# Patient Record
Sex: FEMALE | Race: WHITE | Employment: FULL TIME | URBAN - METROPOLITAN AREA
[De-identification: names, ages, dates, MRNs, and addresses within clinical notes are randomized per-mention and may not be internally consistent; named-entity substitution may affect disease eponyms.]

---

## 2017-01-16 ENCOUNTER — HOSPITAL ENCOUNTER (EMERGENCY)
Age: 52
Discharge: HOME | End: 2017-01-16
Attending: EMERGENCY MEDICINE
Payer: COMMERCIAL

## 2017-01-16 ENCOUNTER — APPOINTMENT (OUTPATIENT)
Dept: GENERAL RADIOLOGY | Age: 52
End: 2017-01-16
Attending: EMERGENCY MEDICINE
Payer: COMMERCIAL

## 2017-01-16 VITALS
TEMPERATURE: 98.3 F | OXYGEN SATURATION: 96 % | RESPIRATION RATE: 14 BRPM | HEART RATE: 91 BPM | DIASTOLIC BLOOD PRESSURE: 76 MMHG | WEIGHT: 158 LBS | HEIGHT: 66 IN | SYSTOLIC BLOOD PRESSURE: 114 MMHG | BODY MASS INDEX: 25.39 KG/M2

## 2017-01-16 DIAGNOSIS — E87.6 HYPOKALEMIA: ICD-10-CM

## 2017-01-16 DIAGNOSIS — I47.1 SVT (SUPRAVENTRICULAR TACHYCARDIA) (HCC): Primary | ICD-10-CM

## 2017-01-16 LAB
ALBUMIN SERPL BCP-MCNC: 3.6 G/DL (ref 3.5–5)
ALBUMIN/GLOB SERPL: 1 {RATIO} (ref 1.1–2.2)
ALP SERPL-CCNC: 80 U/L (ref 45–117)
ALT SERPL-CCNC: 24 U/L (ref 12–78)
ANION GAP BLD CALC-SCNC: 10 MMOL/L (ref 5–15)
AST SERPL W P-5'-P-CCNC: 25 U/L (ref 15–37)
BASOPHILS # BLD AUTO: 0.1 K/UL (ref 0–0.1)
BASOPHILS # BLD: 1 % (ref 0–1)
BILIRUB SERPL-MCNC: 1 MG/DL (ref 0.2–1)
BUN SERPL-MCNC: 11 MG/DL (ref 6–20)
BUN/CREAT SERPL: 13 (ref 12–20)
CALCIUM SERPL-MCNC: 8.9 MG/DL (ref 8.5–10.1)
CHLORIDE SERPL-SCNC: 102 MMOL/L (ref 97–108)
CO2 SERPL-SCNC: 28 MMOL/L (ref 21–32)
CREAT SERPL-MCNC: 0.88 MG/DL (ref 0.55–1.02)
EOSINOPHIL # BLD: 0.2 K/UL (ref 0–0.4)
EOSINOPHIL NFR BLD: 2 % (ref 0–7)
ERYTHROCYTE [DISTWIDTH] IN BLOOD BY AUTOMATED COUNT: 12.7 % (ref 11.5–14.5)
GLOBULIN SER CALC-MCNC: 3.7 G/DL (ref 2–4)
GLUCOSE SERPL-MCNC: 121 MG/DL (ref 65–100)
HCT VFR BLD AUTO: 43.9 % (ref 35–47)
HGB BLD-MCNC: 14.8 G/DL (ref 11.5–16)
LYMPHOCYTES # BLD AUTO: 37 % (ref 12–49)
LYMPHOCYTES # BLD: 3.9 K/UL (ref 0.8–3.5)
MCH RBC QN AUTO: 31 PG (ref 26–34)
MCHC RBC AUTO-ENTMCNC: 33.7 G/DL (ref 30–36.5)
MCV RBC AUTO: 91.8 FL (ref 80–99)
MONOCYTES # BLD: 0.5 K/UL (ref 0–1)
MONOCYTES NFR BLD AUTO: 5 % (ref 5–13)
NEUTS SEG # BLD: 5.8 K/UL (ref 1.8–8)
NEUTS SEG NFR BLD AUTO: 55 % (ref 32–75)
PLATELET # BLD AUTO: 313 K/UL (ref 150–400)
POTASSIUM SERPL-SCNC: 3.2 MMOL/L (ref 3.5–5.1)
PROT SERPL-MCNC: 7.3 G/DL (ref 6.4–8.2)
RBC # BLD AUTO: 4.78 M/UL (ref 3.8–5.2)
SODIUM SERPL-SCNC: 140 MMOL/L (ref 136–145)
TROPONIN I SERPL-MCNC: <0.04 NG/ML
TSH SERPL DL<=0.05 MIU/L-ACNC: 1.83 UIU/ML (ref 0.36–3.74)
WBC # BLD AUTO: 10.5 K/UL (ref 3.6–11)

## 2017-01-16 PROCEDURE — 93005 ELECTROCARDIOGRAM TRACING: CPT

## 2017-01-16 PROCEDURE — 96361 HYDRATE IV INFUSION ADD-ON: CPT

## 2017-01-16 PROCEDURE — 36415 COLL VENOUS BLD VENIPUNCTURE: CPT | Performed by: EMERGENCY MEDICINE

## 2017-01-16 PROCEDURE — 80053 COMPREHEN METABOLIC PANEL: CPT | Performed by: EMERGENCY MEDICINE

## 2017-01-16 PROCEDURE — 96375 TX/PRO/DX INJ NEW DRUG ADDON: CPT

## 2017-01-16 PROCEDURE — 96365 THER/PROPH/DIAG IV INF INIT: CPT

## 2017-01-16 PROCEDURE — 85025 COMPLETE CBC W/AUTO DIFF WBC: CPT | Performed by: EMERGENCY MEDICINE

## 2017-01-16 PROCEDURE — 74011250636 HC RX REV CODE- 250/636: Performed by: EMERGENCY MEDICINE

## 2017-01-16 PROCEDURE — 71020 XR CHEST PA LAT: CPT

## 2017-01-16 PROCEDURE — 84443 ASSAY THYROID STIM HORMONE: CPT | Performed by: EMERGENCY MEDICINE

## 2017-01-16 PROCEDURE — 99285 EMERGENCY DEPT VISIT HI MDM: CPT

## 2017-01-16 PROCEDURE — 74011250637 HC RX REV CODE- 250/637: Performed by: EMERGENCY MEDICINE

## 2017-01-16 PROCEDURE — 84484 ASSAY OF TROPONIN QUANT: CPT | Performed by: EMERGENCY MEDICINE

## 2017-01-16 RX ORDER — PROPAFENONE HYDROCHLORIDE 225 MG/1
225 TABLET, FILM COATED ORAL AS NEEDED
COMMUNITY

## 2017-01-16 RX ORDER — POTASSIUM CHLORIDE 750 MG/1
40 TABLET, FILM COATED, EXTENDED RELEASE ORAL
Status: COMPLETED | OUTPATIENT
Start: 2017-01-16 | End: 2017-01-16

## 2017-01-16 RX ORDER — LORATADINE 10 MG/1
10 TABLET ORAL DAILY
COMMUNITY

## 2017-01-16 RX ORDER — ASPIRIN 325 MG
325 TABLET ORAL
COMMUNITY

## 2017-01-16 RX ORDER — ETHYNODIOL DIACETATE AND ETHINYL ESTRADIOL 1 MG-35MCG
1 KIT ORAL DAILY
COMMUNITY

## 2017-01-16 RX ORDER — POTASSIUM CHLORIDE 750 MG/1
20 TABLET, FILM COATED, EXTENDED RELEASE ORAL DAILY
Qty: 14 TAB | Refills: 0 | Status: SHIPPED | OUTPATIENT
Start: 2017-01-16 | End: 2017-01-23

## 2017-01-16 RX ORDER — ADENOSINE 3 MG/ML
INJECTION, SOLUTION INTRAVENOUS
Status: DISCONTINUED
Start: 2017-01-16 | End: 2017-01-16 | Stop reason: HOSPADM

## 2017-01-16 RX ORDER — THERA TABS 400 MCG
1 TAB ORAL DAILY
COMMUNITY

## 2017-01-16 RX ORDER — ADENOSINE 3 MG/ML
6 INJECTION, SOLUTION INTRAVENOUS
Status: COMPLETED | OUTPATIENT
Start: 2017-01-16 | End: 2017-01-16

## 2017-01-16 RX ORDER — POTASSIUM CHLORIDE 7.45 MG/ML
10 INJECTION INTRAVENOUS
Status: COMPLETED | OUTPATIENT
Start: 2017-01-16 | End: 2017-01-16

## 2017-01-16 RX ADMIN — POTASSIUM CHLORIDE 40 MEQ: 750 TABLET, FILM COATED, EXTENDED RELEASE ORAL at 15:01

## 2017-01-16 RX ADMIN — ADENOSINE 6 MG: 3 INJECTION, SOLUTION INTRAVENOUS at 13:42

## 2017-01-16 RX ADMIN — POTASSIUM CHLORIDE 10 MEQ: 10 INJECTION, SOLUTION INTRAVENOUS at 15:02

## 2017-01-16 RX ADMIN — SODIUM CHLORIDE 1000 ML: 900 INJECTION, SOLUTION INTRAVENOUS at 13:49

## 2017-01-16 NOTE — ED PROVIDER NOTES
HPI Comments: 54 y.o. female with no known significant past medical history who presents from [facility] with chief complaint of palpitations. 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 She reports having a medication prescribed by Dr. Yvan Segovia to use as needed when this tachycardia occurs but does not know the name of the medication. Patient arrives today after experiencing another episode of palpitations this morning around 1155 which coincided with the onset of bilateral arm pain, jaw pain and chest pain. She states these types of pain are unusual with her normal tachycardia. She reports additionally feeling as though she was going to [employer]" whilst laying down which is unusual compared to past episodes. Patient reports taking one pill of her heart medication at 1155 this morning without relief. She denies any tobacco, decongestant or stimulant use. She denies recent medication changes. There are no other acute medical concerns at this time. PCP: Nathalie Forman MD    Note written by mikala Rocha, as dictated by Rajat Tirado MD 1:50 PM      The history is provided by the patient. No past medical history on file. No past surgical history on file. No family history on file. Social History     Social History    Marital status:      Spouse name: N/A    Number of children: N/A    Years of education: N/A     Occupational History    Not on file. Social History Main Topics    Smoking status: Not on file    Smokeless tobacco: Not on file    Alcohol use Not on file    Drug use: Not on file    Sexual activity: Not on file     Other Topics Concern    Not on file     Social History Narrative         ALLERGIES: Sulfa (sulfonamide antibiotics)    Review of Systems   Constitutional: Negative for chills and fever. HENT: Negative for rhinorrhea and sore throat.          Jaw pain   Respiratory: Negative for cough and shortness of breath. Cardiovascular: Positive for chest pain and palpitations. Gastrointestinal: Negative for abdominal pain, diarrhea, nausea and vomiting. Genitourinary: Negative for dysuria and urgency. Musculoskeletal:        Bilateral arm pain   Skin: Negative for rash. Neurological: Positive for light-headedness. Negative for weakness. All other systems reviewed and are negative. Vitals:    01/16/17 1328   BP: (!) 83/72   Pulse: (!) 188   Resp: 18   Temp: 98.3 °F (36.8 °C)   SpO2: 98%   Weight: 71.7 kg (158 lb)   Height: 5' 6\" (1.676 m)            Physical Exam   Const:  Appears uncomfortable, well developed, well nourished,  Head:  Atraumatic, normocephalic  Eyes:  PERRL, conjunctiva normal, no scleral icterus  Neck:  Supple, trachea midline  Cardiovascular:  Tachycardic, no murmurs, no gallops, no rubs  Resp:  No resp distress, no increased work of breathing, no wheezes, no rhonchi, no rales,  Abd:  Soft, non-tender, non-distended, no rebound, no guarding, no CVA tenderness  :  Deferred  MSK:  No pedal edema, normal ROM  Neuro:  Alert and oriented x3, no cranial nerve defect  Skin:  Warm, dry, intact  Psych: normal mood and affect, behavior is normal, judgement and thought content is normal    Note written by mikala Stringer, as dictated by Paola Mojica MD 1:52 PM    MDM  Number of Diagnoses or Management Options  Hypokalemia:   SVT (supraventricular tachycardia):      Amount and/or Complexity of Data Reviewed  Clinical lab tests: ordered and reviewed  Tests in the radiology section of CPT®: ordered and reviewed  Review and summarize past medical records: yes    Patient Progress  Patient progress: stable    ED Course     Pt. Presents to the ER in SVT. She was converted to a sinus rhythm after adenosine and stayed in a sinus rhythm. Pt. Found to have mild hypokalemia. I have begun to replace her potassium. Pt. Has a history of SVT.   I will start her on potassium tabs.  Pt. To f/u with her PCP or return to the ER with worsening sx. Procedures   Cardioversion, electrical   Date/Time: 1/16/2017 1:48 PM  Consent given by patient  Pre-procedure rhythm: SVT  Attempt one: 6 mg of adenosine  Outcome: Conversion to normal sinus rhythm,  Patient tolerated procedure well, no immediate complications    Note written by mikala Barlow, as dictated by Tera Gandhi MD 1:50 PM      EKG interpretation: (Preliminary) 13:34  Rhythm: Supraventricular tachycardia; and regular . Rate (approx.): 173; Axis: normal; QRS interval: normal ; ST/T wave: non-specific changes; Other findings: abnormal ekg. EKG interpretation: (Preliminary) 13:46  Rhythm: normal sinus rhythm; and regular .  Rate (approx.): 95; Axis: normal; P wave: normal; QRS interval: normal ; ST/T wave: normal; Other findings: normal.

## 2017-01-16 NOTE — DISCHARGE INSTRUCTIONS
We hope that we have addressed all of your medical concerns. The examination and treatment you received in the Emergency Department were for an emergent problem and were not intended as complete care. It is important that you follow up with your healthcare provider(s) for ongoing care. If your symptoms worsen or do not improve as expected, and you are unable to reach your usual health care provider(s), you should return to the Emergency Department. Today's healthcare is undergoing tremendous change, and patient satisfaction surveys are one of the many tools to assess the quality of medical care. You may receive a survey from the [employer] regarding your experience in the Emergency Department. I hope that your experience has been completely positive, particularly the medical care that I provided. As such, please participate in the survey; anything less than excellent does not meet my expectations or intentions. Replaced by [facility] and [address] [place] [address] participate in nationally recognized quality of care measures. If your blood pressure is greater than 120/80, as reported below, we urge that you seek medical care to address the potential of high blood pressure, commonly known as hypertension. Hypertension can be hereditary or can be caused by certain medical conditions, pain, stress, or \"white coat syndrome. \"       Please make an appointment with your health care provider(s) for follow up of your Emergency Department visit. VITALS:   Patient Vitals for the past 8 hrs:   Temp Pulse Resp BP SpO2   01/16/17 1342 - (!) 170 - - -   01/16/17 1328 98.3 °F (36.8 °C) (!) 188 18 (!) 83/72 98 %          Thank you for allowing us to provide you with medical care today. We realize that you have many choices for your emergency care needs. Please choose us in the future for any continued health care needs. Ysabel García, MD    [address] [facility].   Office: [phone]            Recent Results (from the past 24 hour(s))   EKG, 12 LEAD, INITIAL    Collection Time: 01/16/17  1:34 PM   Result Value Ref Range    Ventricular Rate 173 BPM    Atrial Rate 166 BPM    QRS Duration 80 ms    Q-T Interval 274 ms    QTC Calculation (Bezet) 464 ms    Calculated R Axis 67 degrees    Calculated T Axis 34 degrees    Diagnosis       Supraventricular tachycardia  Nonspecific ST abnormality  When compared with ECG of 15-MAY-2002 17:38,  Previous ECG has undetermined rhythm, needs review     CBC WITH AUTOMATED DIFF    Collection Time: 01/16/17  1:37 PM   Result Value Ref Range    WBC 10.5 3.6 - 11.0 K/uL    RBC 4.78 3.80 - 5.20 M/uL    HGB 14.8 11.5 - 16.0 g/dL    HCT 43.9 35.0 - 47.0 %    MCV 91.8 80.0 - 99.0 FL    MCH 31.0 26.0 - 34.0 PG    MCHC 33.7 30.0 - 36.5 g/dL    RDW 12.7 11.5 - 14.5 %    PLATELET 639 793 - 459 K/uL    NEUTROPHILS 55 32 - 75 %    LYMPHOCYTES 37 12 - 49 %    MONOCYTES 5 5 - 13 %    EOSINOPHILS 2 0 - 7 %    BASOPHILS 1 0 - 1 %    ABS. NEUTROPHILS 5.8 1.8 - 8.0 K/UL    ABS. LYMPHOCYTES 3.9 (H) 0.8 - 3.5 K/UL    ABS. MONOCYTES 0.5 0.0 - 1.0 K/UL    ABS. EOSINOPHILS 0.2 0.0 - 0.4 K/UL    ABS. BASOPHILS 0.1 0.0 - 0.1 K/UL   METABOLIC PANEL, COMPREHENSIVE    Collection Time: 01/16/17  1:37 PM   Result Value Ref Range    Sodium 140 136 - 145 mmol/L    Potassium 3.2 (L) 3.5 - 5.1 mmol/L    Chloride 102 97 - 108 mmol/L    CO2 28 21 - 32 mmol/L    Anion gap 10 5 - 15 mmol/L    Glucose 121 (H) 65 - 100 mg/dL    BUN 11 6 - 20 MG/DL    Creatinine 0.88 0.55 - 1.02 MG/DL    BUN/Creatinine ratio 13 12 - 20      GFR est AA >60 >60 ml/min/1.73m2    GFR est non-AA >60 >60 ml/min/1.73m2    Calcium 8.9 8.5 - 10.1 MG/DL    Bilirubin, total 1.0 0.2 - 1.0 MG/DL    ALT 24 12 - 78 U/L    AST 25 15 - 37 U/L    Alk.  phosphatase 80 45 - 117 U/L    Protein, total 7.3 6.4 - 8.2 g/dL    Albumin 3.6 3.5 - 5.0 g/dL    Globulin 3.7 2.0 - 4.0 g/dL A-G Ratio 1.0 (L) 1.1 - 2.2     TROPONIN I    Collection Time: 01/16/17  1:37 PM   Result Value Ref Range    Troponin-I, Qt. <0.04 <0.05 ng/mL   TSH 3RD GENERATION    Collection Time: 01/16/17  1:37 PM   Result Value Ref Range    TSH 1.83 0.36 - 3.74 uIU/mL   EKG, 12 LEAD, INITIAL    Collection Time: 01/16/17  1:46 PM   Result Value Ref Range    Ventricular Rate 95 BPM    Atrial Rate 95 BPM    P-R Interval 156 ms    QRS Duration 82 ms    Q-T Interval 350 ms    QTC Calculation (Bezet) 439 ms    Calculated P Axis 83 degrees    Calculated R Axis 67 degrees    Calculated T Axis 37 degrees    Diagnosis       ** Poor data quality, interpretation may be adversely affected  Normal sinus rhythm  When compared with ECG of 16-JAN-2017 13:34,  MANUAL COMPARISON REQUIRED, DATA IS UNCONFIRMED         Xr Chest Pa Lat    Result Date: 1/16/2017  INDICATION:  Supraventricular tachycardia. COMPARISON:  No old study. FINDINGS:  PA and lateral views were obtained of the chest.  The heart is normal in size. The lungs are clear. No consolidation, pulmonary edema, or pleural effusion is evident. The regional osseous structures are unremarkable. Overlying EKG apparatus is noted. IMPRESSION:  No pneumonia or CHF.    \

## 2017-01-16 NOTE — ED TRIAGE NOTES
TRIAGE NOTE: \"I have a history of tachycardia and it started today. This time is made me really dizzy and my chest hurt. I also had some tingling in my arms. \" Heart rate in triage is 180 bpm

## 2017-01-16 NOTE — PROGRESS NOTES
Admission Medication Reconciliation:    Information obtained from: Patient and Rx Query    Significant PMH/Disease States:   No past medical history on file. Chief Complaint for this Admission:    Chief Complaint   Patient presents with    Rapid Heart Rate       Allergies:  Sulfa (sulfonamide antibiotics)    Prior to Admission Medications:   Prior to Admission Medications   Prescriptions Last Dose Informant Patient Reported? Taking?   aspirin (ASPIRIN) 325 mg tablet 1/16/2017 at Unknown time  Yes Yes   Sig: Take 325 mg by mouth daily as needed for Pain. Patient took 2 tablets prior to arrival (1/16/17)   ethynodiol-ethinyl estradiol (Shaniqua Ledger 1/35, 28,) 1-35 mg-mcg tab 1/16/2017 at Unknown time  Yes Yes   Sig: Take 1 Tab by mouth daily. loratadine (CLARITIN) 10 mg tablet 1/16/2017 at Unknown time  Yes Yes   Sig: Take 10 mg by mouth daily. propafenone (RYTHMOL) 225 mg tablet 1/16/2017 at Unknown time  Yes Yes   Sig: Take 225 mg by mouth as needed (Tachycardia symptoms). Repeat in 90 minutes if tachycardia persists   therapeutic multivitamin (THERA) tablet 1/16/2017 at Unknown time  Yes Yes   Sig: Take 1 Tab by mouth daily. Facility-Administered Medications: None         Comments/Recommendations:     Spoke with patient regarding allergies and PTA medications. 1) Reviewed and confirmed allergy. 2) Updated PTA medication list. Added ASA, Kelnor, loratadine, propafenone, and multivitamin. Regarding aspirin, patient states that she took 2 tablets prior to arrival today. Patient states her last doses of all medications were today.       Errol Crawford, PharmD

## 2017-01-16 NOTE — ED NOTES
Pt administered 1 dose of 6mg Adenosine. Pt converted back to NSR with HR in 90s. Pt tolerated procedure very well.

## 2017-01-16 NOTE — ED NOTES
Pt took prescribed Propafenone at 1145 but \"I kept feeling worse so I came in\".  Pt stating that she will typically take an additional pill but symptoms were worse during this event

## 2017-01-17 LAB
ATRIAL RATE: 166 BPM
ATRIAL RATE: 95 BPM
CALCULATED P AXIS, ECG09: 83 DEGREES
CALCULATED R AXIS, ECG10: 67 DEGREES
CALCULATED R AXIS, ECG10: 67 DEGREES
CALCULATED T AXIS, ECG11: 34 DEGREES
CALCULATED T AXIS, ECG11: 37 DEGREES
DIAGNOSIS, 93000: NORMAL
DIAGNOSIS, 93000: NORMAL
P-R INTERVAL, ECG05: 156 MS
Q-T INTERVAL, ECG07: 274 MS
Q-T INTERVAL, ECG07: 350 MS
QRS DURATION, ECG06: 80 MS
QRS DURATION, ECG06: 82 MS
QTC CALCULATION (BEZET), ECG08: 439 MS
QTC CALCULATION (BEZET), ECG08: 464 MS
VENTRICULAR RATE, ECG03: 173 BPM
VENTRICULAR RATE, ECG03: 95 BPM